# Patient Record
(demographics unavailable — no encounter records)

---

## 2025-04-14 NOTE — HISTORY OF PRESENT ILLNESS
[None] : no symptoms [FreeTextEntry1] : 51M for L kidney stones. UA nonreactive with 145 RBCs, Cr 1.11 3/25. Seen in ED 3/25/25 then with imaging showing poss recent passage of L stone. Pt report was feeling mild R flank discomfort about 2 days after ED visit. Never seen passage of stone.   NO bothersome urinary issue, gross hematuria.   Blood Thinner: NO  Allergy: NKDA PMHx: NO  Meds: NO  SurgHx: NO h/o  Surgery SoHx: -Smoke: NO  -ETOH: Daily -Drugs: NO  FamHx: UC in father.

## 2025-04-14 NOTE — ASSESSMENT
[FreeTextEntry1] : 51M h/o L kidney stones and microhematuria, likely passed stone spontaneously on 3/25/25.   CT imaging on 3/25/25 reviewed with Pt - mild left hydro / ureteral thickening - possible thickening in bladder near left UVJ prompting cystoscopy  Cysto unremarkable today, urine sample obtained during Cysto.   PLAN -repeat UA/UCX -Urine Cytology -US Renal in 1 mon -F/u results

## 2025-04-14 NOTE — PHYSICAL EXAM
[Normal Appearance] : normal appearance [Well Groomed] : well groomed [Edema] : no peripheral edema [General Appearance - In No Acute Distress] : no acute distress [] : no respiratory distress [Respiration, Rhythm And Depth] : normal respiratory rhythm and effort [Exaggerated Use Of Accessory Muscles For Inspiration] : no accessory muscle use [Abdomen Soft] : soft [Abdomen Tenderness] : non-tender [Costovertebral Angle Tenderness] : no ~M costovertebral angle tenderness [Normal Station and Gait] : the gait and station were normal for the patient's age [Oriented To Time, Place, And Person] : oriented to person, place, and time